# Patient Record
Sex: FEMALE | Race: WHITE | NOT HISPANIC OR LATINO | URBAN - METROPOLITAN AREA
[De-identification: names, ages, dates, MRNs, and addresses within clinical notes are randomized per-mention and may not be internally consistent; named-entity substitution may affect disease eponyms.]

---

## 2022-02-28 ENCOUNTER — PREPPED CHART (OUTPATIENT)
Dept: URBAN - METROPOLITAN AREA CLINIC 21 | Facility: CLINIC | Age: 77
End: 2022-02-28

## 2022-02-28 ENCOUNTER — FOLLOW UP (OUTPATIENT)
Dept: URBAN - METROPOLITAN AREA CLINIC 21 | Facility: CLINIC | Age: 77
End: 2022-02-28

## 2022-02-28 DIAGNOSIS — M35.01: ICD-10-CM

## 2022-02-28 DIAGNOSIS — H02.88A: ICD-10-CM

## 2022-02-28 DIAGNOSIS — H18.832: ICD-10-CM

## 2022-02-28 DIAGNOSIS — H02.88B: ICD-10-CM

## 2022-02-28 PROCEDURE — 99213 OFFICE O/P EST LOW 20 MIN: CPT

## 2022-02-28 ASSESSMENT — TONOMETRY
OD_IOP_MMHG: 20
OS_IOP_MMHG: 18

## 2022-02-28 ASSESSMENT — VISUAL ACUITY
OS_SC: 20/70-1
OU_SC: 20/30
OD_SC: 20/50-1

## 2022-02-28 NOTE — PATIENT DISCUSSION
Continue: - *  RETAINE PM - ONCE AT NIGHT TO THE RIGHT EYE -* RESTASIS - 1 DROP TO BOTH EYES TWICE A DAY -* RETAINE MGD - AT LEAST 3 TIMES A DAY TO BOTH EYES -* EYE COVER (WET CLOTH W/ CELOPHANE).

## 2022-03-09 ENCOUNTER — ESTABLISHED PATIENT (OUTPATIENT)
Dept: URBAN - METROPOLITAN AREA CLINIC 21 | Facility: CLINIC | Age: 77
End: 2022-03-09

## 2022-03-09 DIAGNOSIS — H52.4: ICD-10-CM

## 2022-03-09 PROCEDURE — 92012 INTRM OPH EXAM EST PATIENT: CPT

## 2022-03-09 ASSESSMENT — VISUAL ACUITY
OU_SC: 20/60
OD_SC: 20/50-1
OS_SC: 20/70-1

## 2022-03-09 ASSESSMENT — TONOMETRY
OS_IOP_MMHG: 15
OD_IOP_MMHG: 14

## 2022-03-09 NOTE — PATIENT DISCUSSION
MRx only today; poor/inconsistent responses during refraction w/fluctuating clarity of vision; low reliability results today, do not advise changing Rx at this time. believe these findings c/w poorly controlled dryness, cont f/u w/ Dr Kurt Verdin, may consider repeat MRx when dryness is under better control.

## 2022-05-19 ENCOUNTER — ESTABLISHED PATIENT (OUTPATIENT)
Dept: URBAN - METROPOLITAN AREA CLINIC 21 | Facility: CLINIC | Age: 77
End: 2022-05-19

## 2022-05-19 DIAGNOSIS — M35.01: ICD-10-CM

## 2022-05-19 DIAGNOSIS — H02.88A: ICD-10-CM

## 2022-05-19 DIAGNOSIS — H02.88B: ICD-10-CM

## 2022-05-19 DIAGNOSIS — H40.1131: ICD-10-CM

## 2022-05-19 DIAGNOSIS — H18.832: ICD-10-CM

## 2022-05-19 PROCEDURE — 92133 CPTRZD OPH DX IMG PST SGM ON: CPT

## 2022-05-19 PROCEDURE — 76514 ECHO EXAM OF EYE THICKNESS: CPT

## 2022-05-19 PROCEDURE — 99214 OFFICE O/P EST MOD 30 MIN: CPT

## 2022-05-19 RX ORDER — LOTEPREDNOL ETABONATE 2 MG/ML
1 SUSPENSION/ DROPS OPHTHALMIC ONCE A DAY
Start: 2022-05-19

## 2022-05-19 RX ORDER — LOTEPREDNOL ETABONATE 3.8 MG/G
1 GEL OPHTHALMIC
Start: 2022-05-19

## 2022-05-19 ASSESSMENT — VISUAL ACUITY
OS_PH: 20/30-2
OD_SC: 20/50+2
OD_PH: 20/25-1
OS_SC: 20/50+1

## 2022-05-19 ASSESSMENT — TONOMETRY
OD_IOP_MMHG: 18
OS_IOP_MMHG: 19
OD_IOP_MMHG: 18
OS_IOP_MMHG: 18

## 2022-05-19 NOTE — PATIENT DISCUSSION
PLAN: USE ALREX FOR A FEW WEEKS, THEN ADD PLUGS AGAIN IF NEEDED// CAN ALSO CONSIDER PLASMA /OR [address] / AND/OR AMBIO.

## 2022-08-01 ENCOUNTER — FOLLOW UP (OUTPATIENT)
Dept: URBAN - METROPOLITAN AREA CLINIC 21 | Facility: CLINIC | Age: 77
End: 2022-08-01

## 2022-08-01 DIAGNOSIS — H02.88B: ICD-10-CM

## 2022-08-01 DIAGNOSIS — H18.832: ICD-10-CM

## 2022-08-01 DIAGNOSIS — H02.88A: ICD-10-CM

## 2022-08-01 DIAGNOSIS — H40.1131: ICD-10-CM

## 2022-08-01 DIAGNOSIS — M35.01: ICD-10-CM

## 2022-08-01 PROCEDURE — 99213 OFFICE O/P EST LOW 20 MIN: CPT

## 2022-08-01 PROCEDURE — 68761 CLOSE TEAR DUCT OPENING: CPT

## 2022-08-01 RX ORDER — FLUOROMETHOLONE 1 MG/ML: 1 SOLUTION/ DROPS OPHTHALMIC ONCE A DAY

## 2022-08-01 ASSESSMENT — TONOMETRY
OS_IOP_MMHG: 18
OD_IOP_MMHG: 18
OS_IOP_MMHG: 19
OD_IOP_MMHG: 19

## 2022-08-01 ASSESSMENT — VISUAL ACUITY
OS_SC: 20/70+2
OD_SC: 20/60-1

## 2022-08-01 NOTE — PROCEDURE NOTE: CLINICAL
PROCEDURE NOTE: Punctal Plugs, Extended Bilateral Lower Lids. Diagnosis: Sjögren Syndrome with Keratoconjunctivitis. Prior to treatment, the risks/benefits/alternatives were discussed. The patient wished to proceed with procedure. Extended duration plugs were inserted. Brand: Sherman Finley. Size: 0.3mm Location: RLL/LLL punctum. Patient tolerated procedure well. There were no complications. Post procedure instructions given. Rakesh Henson

## 2022-08-01 NOTE — PATIENT DISCUSSION
** Eyes still irritated.  -- was a little better since using Alrex.  However, Insurance is not covering-- so we are going to sub for [address] [place] [address] for now.

## 2022-12-12 ENCOUNTER — ESTABLISHED PATIENT (OUTPATIENT)
Dept: URBAN - METROPOLITAN AREA CLINIC 21 | Facility: CLINIC | Age: 77
End: 2022-12-12

## 2022-12-12 PROCEDURE — 92014 COMPRE OPH EXAM EST PT 1/>: CPT

## 2022-12-12 PROCEDURE — 68761 CLOSE TEAR DUCT OPENING: CPT

## 2022-12-12 ASSESSMENT — TONOMETRY
OD_IOP_MMHG: 18
OS_IOP_MMHG: 16
OS_IOP_MMHG: 16
OD_IOP_MMHG: 20

## 2022-12-12 ASSESSMENT — VISUAL ACUITY
OD_PH: 20/30-2
OS_PH: 20/50
OU_SC: 20/40+2
OD_SC: 20/50+1
OS_SC: 20/60+1
OU_CC: J2+2

## 2022-12-12 NOTE — PROCEDURE NOTE: CLINICAL
PROCEDURE NOTE: Punctal Plugs, Extended OU. Diagnosis: Sjögren Syndrome with Keratoconjunctivitis. Prior to treatment, the risks/benefits/alternatives were discussed. The patient wished to proceed with procedure. Extended duration plugs were inserted. Brand: *. Size: *mm Location: * punctum. Patient tolerated procedure well. There were no complications. Post procedure instructions given. Terry Gillette

## 2022-12-12 NOTE — PATIENT DISCUSSION
* Sending message to [facility] for a sleep mask to help with keeping eyes closed while sleeping.

## 2023-04-10 ENCOUNTER — TECH ONLY (OUTPATIENT)
Dept: URBAN - METROPOLITAN AREA CLINIC 21 | Facility: CLINIC | Age: 78
End: 2023-04-10

## 2023-04-10 DIAGNOSIS — H40.1131: ICD-10-CM

## 2023-04-10 PROCEDURE — 92083 EXTENDED VISUAL FIELD XM: CPT

## 2023-04-10 PROCEDURE — 92133 CPTRZD OPH DX IMG PST SGM ON: CPT

## 2023-04-10 PROCEDURE — 99211T TECH SERVICE

## 2023-04-10 ASSESSMENT — TONOMETRY
OS_IOP_MMHG: 19
OD_IOP_MMHG: 20

## 2023-05-02 ENCOUNTER — FOLLOW UP (OUTPATIENT)
Dept: URBAN - METROPOLITAN AREA CLINIC 21 | Facility: CLINIC | Age: 78
End: 2023-05-02

## 2023-05-02 DIAGNOSIS — M35.01: ICD-10-CM

## 2023-05-02 PROCEDURE — 68761 CLOSE TEAR DUCT OPENING: CPT

## 2023-05-02 PROCEDURE — 92012 INTRM OPH EXAM EST PATIENT: CPT

## 2023-05-02 RX ORDER — LATANOPROST 50 UG/ML: 1 SOLUTION/ DROPS OPHTHALMIC EVERY EVENING

## 2023-05-02 ASSESSMENT — VISUAL ACUITY
OS_PH: 20/40
OS_SC: 20/60-1
OD_SC: 20/50
OD_PH: 20/40-2

## 2023-05-02 ASSESSMENT — TONOMETRY
OS_IOP_MMHG: 20
OD_IOP_MMHG: 22

## 2023-09-06 ENCOUNTER — TECH ONLY (OUTPATIENT)
Dept: URBAN - METROPOLITAN AREA CLINIC 21 | Facility: CLINIC | Age: 78
End: 2023-09-06

## 2023-09-06 DIAGNOSIS — H40.1131: ICD-10-CM

## 2023-09-06 PROCEDURE — 92083 EXTENDED VISUAL FIELD XM: CPT

## 2023-09-06 PROCEDURE — 99211T TECH SERVICE

## 2023-09-26 ENCOUNTER — ESTABLISHED PATIENT (OUTPATIENT)
Dept: URBAN - METROPOLITAN AREA CLINIC 21 | Facility: CLINIC | Age: 78
End: 2023-09-26

## 2023-09-26 DIAGNOSIS — M35.01: ICD-10-CM

## 2023-09-26 PROCEDURE — 68761 CLOSE TEAR DUCT OPENING: CPT

## 2023-09-26 PROCEDURE — 99213 OFFICE O/P EST LOW 20 MIN: CPT

## 2023-09-26 ASSESSMENT — TONOMETRY
OS_IOP_MMHG: 14
OD_IOP_MMHG: 16

## 2023-09-26 ASSESSMENT — VISUAL ACUITY
OD_SC: 20/40+1
OS_SC: 20/50-1

## 2024-01-30 ENCOUNTER — ESTABLISHED PATIENT (OUTPATIENT)
Dept: URBAN - METROPOLITAN AREA CLINIC 21 | Facility: CLINIC | Age: 79
End: 2024-01-30

## 2024-01-30 DIAGNOSIS — H18.593: ICD-10-CM

## 2024-01-30 DIAGNOSIS — H35.363: ICD-10-CM

## 2024-01-30 DIAGNOSIS — M35.01: ICD-10-CM

## 2024-01-30 PROCEDURE — 92134 CPTRZ OPH DX IMG PST SGM RTA: CPT

## 2024-01-30 PROCEDURE — 68761 CLOSE TEAR DUCT OPENING: CPT

## 2024-01-30 PROCEDURE — 99213 OFFICE O/P EST LOW 20 MIN: CPT

## 2024-01-30 ASSESSMENT — VISUAL ACUITY
OS_SC: 20/60
OD_SC: 20/40-2

## 2024-01-30 ASSESSMENT — TONOMETRY
OD_IOP_MMHG: 19
OS_IOP_MMHG: 18

## 2024-06-21 ENCOUNTER — TECH ONLY (OUTPATIENT)
Dept: URBAN - METROPOLITAN AREA CLINIC 21 | Facility: CLINIC | Age: 79
End: 2024-06-21

## 2024-07-16 ENCOUNTER — FOLLOW UP (OUTPATIENT)
Dept: URBAN - METROPOLITAN AREA CLINIC 21 | Facility: CLINIC | Age: 79
End: 2024-07-16

## 2024-07-16 DIAGNOSIS — M35.01: ICD-10-CM

## 2024-07-16 DIAGNOSIS — H18.593: ICD-10-CM

## 2024-07-16 PROCEDURE — A4262 TEMPORARY TEAR DUCT PLUG: HCPCS

## 2024-07-16 PROCEDURE — 92012 INTRM OPH EXAM EST PATIENT: CPT

## 2024-07-16 ASSESSMENT — VISUAL ACUITY
OU_SC: 20/40
OS_SC: 20/70-2
OS_PH: 20/60+1
OU_SC: J2
OD_SC: 20/40-1

## 2024-07-16 ASSESSMENT — TONOMETRY
OS_IOP_MMHG: 19
OD_IOP_MMHG: 19

## 2024-11-19 ENCOUNTER — FOLLOW UP (OUTPATIENT)
Dept: URBAN - METROPOLITAN AREA CLINIC 21 | Facility: CLINIC | Age: 79
End: 2024-11-19

## 2024-11-19 DIAGNOSIS — H18.593: ICD-10-CM

## 2024-11-19 DIAGNOSIS — H26.491: ICD-10-CM

## 2024-11-19 DIAGNOSIS — H40.1131: ICD-10-CM

## 2024-11-19 DIAGNOSIS — M35.01: ICD-10-CM

## 2024-11-19 PROCEDURE — 68761 CLOSE TEAR DUCT OPENING: CPT

## 2024-11-19 PROCEDURE — 92012 INTRM OPH EXAM EST PATIENT: CPT

## 2024-12-03 ENCOUNTER — CONSULTATION/EVALUATION (OUTPATIENT)
Age: 79
End: 2024-12-03

## 2024-12-03 DIAGNOSIS — H35.363: ICD-10-CM

## 2024-12-03 DIAGNOSIS — H26.491: ICD-10-CM

## 2024-12-03 DIAGNOSIS — H02.88B: ICD-10-CM

## 2024-12-03 DIAGNOSIS — H02.88A: ICD-10-CM

## 2024-12-03 DIAGNOSIS — H40.1131: ICD-10-CM

## 2024-12-03 DIAGNOSIS — H18.593: ICD-10-CM

## 2024-12-03 PROCEDURE — 92014 COMPRE OPH EXAM EST PT 1/>: CPT

## 2024-12-03 PROCEDURE — 92134 CPTRZ OPH DX IMG PST SGM RTA: CPT

## 2024-12-03 PROCEDURE — 66821 AFTER CATARACT LASER SURGERY: CPT

## 2024-12-20 ENCOUNTER — FOLLOW UP (OUTPATIENT)
Age: 79
End: 2024-12-20

## 2024-12-20 DIAGNOSIS — H18.832: ICD-10-CM

## 2024-12-20 DIAGNOSIS — H02.88A: ICD-10-CM

## 2024-12-20 DIAGNOSIS — H02.88B: ICD-10-CM

## 2024-12-20 DIAGNOSIS — H40.1131: ICD-10-CM

## 2024-12-20 PROCEDURE — 99214 OFFICE O/P EST MOD 30 MIN: CPT | Mod: 24

## 2024-12-20 RX ORDER — FLUOROMETHOLONE 1 MG/ML
1 SOLUTION/ DROPS OPHTHALMIC TWICE A DAY
Start: 2024-12-20

## 2025-03-25 ENCOUNTER — FOLLOW UP (OUTPATIENT)
Age: 80
End: 2025-03-25

## 2025-03-25 DIAGNOSIS — M35.01: ICD-10-CM

## 2025-03-25 DIAGNOSIS — H40.1131: ICD-10-CM

## 2025-03-25 DIAGNOSIS — H02.88B: ICD-10-CM

## 2025-03-25 DIAGNOSIS — H02.88A: ICD-10-CM

## 2025-03-25 PROCEDURE — 99213 OFFICE O/P EST LOW 20 MIN: CPT | Mod: 25

## 2025-03-25 PROCEDURE — 68761 CLOSE TEAR DUCT OPENING: CPT | Mod: 50

## 2025-07-01 ENCOUNTER — FOLLOW UP (OUTPATIENT)
Age: 80
End: 2025-07-01

## 2025-07-01 DIAGNOSIS — M35.01: ICD-10-CM

## 2025-07-01 PROCEDURE — 68761 CLOSE TEAR DUCT OPENING: CPT

## 2025-07-01 PROCEDURE — 99214 OFFICE O/P EST MOD 30 MIN: CPT

## 2025-07-21 ENCOUNTER — EMERGENCY VISIT (OUTPATIENT)
Age: 80
End: 2025-07-21

## 2025-07-21 DIAGNOSIS — H11.32: ICD-10-CM

## 2025-07-21 PROCEDURE — 99213 OFFICE O/P EST LOW 20 MIN: CPT

## 2025-08-04 ENCOUNTER — FOLLOW UP (OUTPATIENT)
Age: 80
End: 2025-08-04

## 2025-08-04 DIAGNOSIS — H11.32: ICD-10-CM

## 2025-08-04 DIAGNOSIS — M35.01: ICD-10-CM

## 2025-08-04 PROCEDURE — 99213 OFFICE O/P EST LOW 20 MIN: CPT
